# Patient Record
(demographics unavailable — no encounter records)

---

## 2024-10-22 NOTE — REASON FOR VISIT
[Follow-Up Visit] : a follow-up visit for [Morbid Obesity (BMI>40)] : morbid obesity (bmi>40) [S/P Bariatric Surgery] : s/p bariatric surgery [Referring By:  ___] : ~Joanne Ln~ was referred for psychological evaluation by Dr. ENCINAS [Adherence to Medical Regimen] : assess adherence to medical regimen [Emotional Factors Affecting Medical Condition] : assess emotional factors affecting medical condition [Other Location: e.g. School (Enter Location, City,State)___] : at [unfilled], at the time of the visit. [Other Location: e.g. Home (Enter Location, City,State)___] : at [unfilled] [Verbal consent obtained from patient] : the patient, [unfilled] [de-identified] : for tx of psyc sxs related to obesity [de-identified] : Confidentiality and its limitations were explained.

## 2024-10-22 NOTE — DISCUSSION/SUMMARY
[Conventional grooming and hygiene] : Conventional grooming and hygiene [Calm, cooperative] : Calm, cooperative [No unusual behaviors, movements, etc.] : No unusual behaviors, movements, etc. [Normal Rate/Tone/Volume] : Normal Rate/Tone/Volume [Logical, Goal Directed] : Logical, Goal Directed [Behavior plan developed] : Behavior plan developed [Strategies to improve adherence identified] : Strategies to improve adherence identified [Addtnl Health & Behavior Intervention: Individual] : Additional Health and Behavior Intervention: Individual [Coping skills training to overcome social/emotional barriers to adherence] : Coping skills training to overcome social/emotional barriers to adherence [Normal Range] : Normal Range [Euthymic] : Euthymic [de-identified] : good [de-identified] : inconsistent [de-identified] : Concerta causes decreased appetite during day which leads to overeating at night [de-identified] : supportive partner - Pt  scheduled her wedding for 11/2025 and feeling renewed motivation to lose weight.  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   Occasional consumption of carbonated drinks.  Not hungry during the day due to Concerta and has been skipping meals. Also prescribed Lexapro for depression. [de-identified] : Psychological factors (overeating, poor dietary choices) affecting other medical conditions (obesity) Obesity ADHD, Combined Type, by hx Major Depressive Disorder, Recurrent, In Remission Generalized Anxiety Disorder, In Remission [FreeTextEntry3] : follow up with writer x 1 mth [FreeTextEntry5] : compliance with dietary and behavioral recommendations

## 2024-11-27 NOTE — REASON FOR VISIT
[Follow-Up Visit] : a follow-up visit for [Morbid Obesity (BMI>40)] : morbid obesity (bmi>40) [S/P Bariatric Surgery] : s/p bariatric surgery [Referring By:  ___] : ~Joanne Ln~ was referred for psychological evaluation by Dr. ENCINAS [Adherence to Medical Regimen] : assess adherence to medical regimen [Emotional Factors Affecting Medical Condition] : assess emotional factors affecting medical condition [Home] : at home, [unfilled] , at the time of the visit. [Other Location: e.g. Home (Enter Location, City,State)___] : at [unfilled] [Verbal consent obtained from patient] : the patient, [unfilled] [de-identified] : for tx of psyc sxs related to obesity [de-identified] : Confidentiality and its limitations were explained.

## 2024-11-27 NOTE — DISCUSSION/SUMMARY
[Conventional grooming and hygiene] : Conventional grooming and hygiene [Calm, cooperative] : Calm, cooperative [No unusual behaviors, movements, etc.] : No unusual behaviors, movements, etc. [Normal Rate/Tone/Volume] : Normal Rate/Tone/Volume [Normal Range] : Normal Range [Euthymic] : Euthymic [Logical, Goal Directed] : Logical, Goal Directed [Behavior plan developed] : Behavior plan developed [Strategies to improve adherence identified] : Strategies to improve adherence identified [Addtnl Health & Behavior Intervention: Individual] : Additional Health and Behavior Intervention: Individual [Coping skills training to overcome social/emotional barriers to adherence] : Coping skills training to overcome social/emotional barriers to adherence [de-identified] : good [de-identified] : improving [de-identified] : Concerta causes decreased appetite during day which leads to overeating at night [de-identified] : supportive partner - Pt  scheduled her wedding for 11/2025 and feeling renewed motivation to lose weight.  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   Occasional consumption of carbonated drinks.  Not hungry during the day due to Concerta and has been skipping meals. Also prescribed Lexapro for depression. [de-identified] : Psychological factors (overeating, poor dietary choices) affecting other medical conditions (obesity) Obesity ADHD, Combined Type, by hx Major Depressive Disorder, Recurrent, In Remission Generalized Anxiety Disorder, In Remission [FreeTextEntry3] : follow up with writer x 1 mth [FreeTextEntry5] : compliance with dietary and behavioral recommendations

## 2024-12-23 NOTE — DISCUSSION/SUMMARY
[Conventional grooming and hygiene] : Conventional grooming and hygiene [Calm, cooperative] : Calm, cooperative [No unusual behaviors, movements, etc.] : No unusual behaviors, movements, etc. [Normal Rate/Tone/Volume] : Normal Rate/Tone/Volume [Normal Range] : Normal Range [Euthymic] : Euthymic [Logical, Goal Directed] : Logical, Goal Directed [Behavior plan developed] : Behavior plan developed [Strategies to improve adherence identified] : Strategies to improve adherence identified [Addtnl Health & Behavior Intervention: Individual] : Additional Health and Behavior Intervention: Individual [Coping skills training to overcome social/emotional barriers to adherence] : Coping skills training to overcome social/emotional barriers to adherence [de-identified] : good [de-identified] : improving [de-identified] : Concerta causes decreased appetite during day which leads to overeating at night [de-identified] : supportive partner - Pt  scheduled her wedding for 11/2025 and feeling renewed motivation to lose weight.  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   Occasional consumption of carbonated drinks.  Not hungry during the day due to Concerta and has been skipping meals. Also prescribed Lexapro for depression. [de-identified] : Psychological factors (overeating, poor dietary choices) affecting other medical conditions (obesity) Obesity ADHD, Combined Type, by hx Major Depressive Disorder, Recurrent, In Remission Generalized Anxiety Disorder, In Remission [FreeTextEntry3] : follow up with writer x 1 mth [FreeTextEntry5] : compliance with dietary and behavioral recommendations

## 2024-12-23 NOTE — REASON FOR VISIT
[Follow-Up Visit] : a follow-up visit for [Morbid Obesity (BMI>40)] : morbid obesity (bmi>40) [S/P Bariatric Surgery] : s/p bariatric surgery [Referring By:  ___] : ~Joanne Ln~ was referred for psychological evaluation by Dr. ENCINAS [Adherence to Medical Regimen] : assess adherence to medical regimen [Emotional Factors Affecting Medical Condition] : assess emotional factors affecting medical condition [Home] : at home, [unfilled] , at the time of the visit. [Other Location: e.g. Home (Enter Location, City,State)___] : at [unfilled] [Verbal consent obtained from patient] : the patient, [unfilled] [de-identified] : for tx of psyc sxs related to obesity [de-identified] : Confidentiality and its limitations were explained.

## 2025-01-29 NOTE — DISCUSSION/SUMMARY
[Conventional grooming and hygiene] : Conventional grooming and hygiene [Calm, cooperative] : Calm, cooperative [No unusual behaviors, movements, etc.] : No unusual behaviors, movements, etc. [Normal Rate/Tone/Volume] : Normal Rate/Tone/Volume [Normal Range] : Normal Range [Euthymic] : Euthymic [Logical, Goal Directed] : Logical, Goal Directed [de-identified] : good [de-identified] : improving [de-identified] : Concerta causes decreased appetite during day which leads to overeating at night [de-identified] : supportive partner - Pt  scheduled her wedding for 11/2025 and feeling renewed motivation to lose weight.  [Behavior plan developed] : Behavior plan developed [Strategies to improve adherence identified] : Strategies to improve adherence identified [Addtnl Health & Behavior Intervention: Individual] : Additional Health and Behavior Intervention: Individual [Coping skills training to overcome social/emotional barriers to adherence] : Coping skills training to overcome social/emotional barriers to adherence [de-identified] : Psychological factors (overeating, poor dietary choices) affecting other medical conditions (obesity) Obesity ADHD, Combined Type, by hx Major Depressive Disorder, Recurrent, In Remission Generalized Anxiety Disorder, In Remission [FreeTextEntry1] : Katelyn had a sleeve gastrectomy 5/26/2021 and was referred to writer due to weight regain.   Occasional consumption of carbonated drinks.  Not hungry during the day due to Concerta and has been skipping meals. Also prescribed Lexapro for depression. [FreeTextEntry3] : follow up with writer x 1 mth keep a food log [FreeTextEntry5] : compliance with dietary and behavioral recommendations

## 2025-01-29 NOTE — REASON FOR VISIT
[Follow-Up Visit] : a follow-up visit for [Morbid Obesity (BMI>40)] : morbid obesity (bmi>40) [S/P Bariatric Surgery] : s/p bariatric surgery [Referring By:  ___] : ~Joanne Ln~ was referred for psychological evaluation by Dr. ENCINAS [Adherence to Medical Regimen] : assess adherence to medical regimen [Emotional Factors Affecting Medical Condition] : assess emotional factors affecting medical condition [de-identified] : for tx of psyc sxs related to obesity [Home] : at home, [unfilled] , at the time of the visit. [Other Location: e.g. Home (Enter Location, City,State)___] : at [unfilled] [Verbal consent obtained from patient] : the patient, [unfilled] [de-identified] : Confidentiality and its limitations were explained.

## 2025-03-03 NOTE — REASON FOR VISIT
[Follow-Up Visit] : a follow-up visit for [Morbid Obesity (BMI>40)] : morbid obesity (bmi>40) [S/P Bariatric Surgery] : s/p bariatric surgery [Referring By:  ___] : ~Joanne Ln~ was referred for psychological evaluation by Dr. ENCINAS [Adherence to Medical Regimen] : assess adherence to medical regimen [Emotional Factors Affecting Medical Condition] : assess emotional factors affecting medical condition [Verbal consent obtained from patient] : the patient, [unfilled] [de-identified] : for tx of psyc sxs related to obesity [Other Location: e.g. School (Enter Location, City,State)___] : at [unfilled], at the time of the visit. [Medical Office: ([facility])___] : at the medical office located in  [de-identified] : Confidentiality and its limitations were explained.

## 2025-03-03 NOTE — DISCUSSION/SUMMARY
[Conventional grooming and hygiene] : Conventional grooming and hygiene [Calm, cooperative] : Calm, cooperative [No unusual behaviors, movements, etc.] : No unusual behaviors, movements, etc. [Normal Rate/Tone/Volume] : Normal Rate/Tone/Volume [Normal Range] : Normal Range [Euthymic] : Euthymic [Logical, Goal Directed] : Logical, Goal Directed [Behavior plan developed] : Behavior plan developed [Strategies to improve adherence identified] : Strategies to improve adherence identified [Addtnl Health & Behavior Intervention: Individual] : Additional Health and Behavior Intervention: Individual [Coping skills training to overcome social/emotional barriers to adherence] : Coping skills training to overcome social/emotional barriers to adherence [de-identified] : good [de-identified] : improving [de-identified] : Concerta causes decreased appetite during day which leads to overeating at night [de-identified] : supportive partner - Pt  scheduled her wedding for 11/2025 and feeling renewed motivation to lose weight.  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   Occasional consumption of carbonated drinks.  Not hungry during the day due to Concerta and has been skipping meals. Also prescribed Lexapro for depression. [de-identified] : Psychological factors (overeating, poor dietary choices) affecting other medical conditions (obesity) Obesity ADHD, Combined Type, by hx Major Depressive Disorder, Recurrent, In Remission Generalized Anxiety Disorder, In Remission [FreeTextEntry3] : follow up with writer x 1 mth keep a food log [FreeTextEntry5] : compliance with dietary and behavioral recommendations

## 2025-04-29 NOTE — DISCUSSION/SUMMARY
[Conventional grooming and hygiene] : Conventional grooming and hygiene [Calm, cooperative] : Calm, cooperative [No unusual behaviors, movements, etc.] : No unusual behaviors, movements, etc. [Normal Rate/Tone/Volume] : Normal Rate/Tone/Volume [Normal Range] : Normal Range [Euthymic] : Euthymic [Logical, Goal Directed] : Logical, Goal Directed [Behavior plan developed] : Behavior plan developed [Strategies to improve adherence identified] : Strategies to improve adherence identified [Addtnl Health & Behavior Intervention: Individual] : Additional Health and Behavior Intervention: Individual [Coping skills training to overcome social/emotional barriers to adherence] : Coping skills training to overcome social/emotional barriers to adherence [None] : None [No delusions or hallucinations] : No delusions or hallucinations [Oriented to person, place, time] : Oriented to person, place, time [Intact/WNL] : Intact/WNL [Adequate/WNL] : Adequate/WNL [de-identified] : good [de-identified] : improving [de-identified] : Concerta causes decreased appetite during day which leads to overeating at night [de-identified] : supportive partner - Pt  scheduled her wedding for 11/2025 and feeling renewed motivation to lose weight.  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   Occasional consumption of carbonated drinks.  Not hungry during the day due to Concerta and has been skipping meals. Also prescribed Lexapro and Wellbutrin for depression. [de-identified] : Psychological factors (overeating, poor dietary choices) affecting other medical conditions (obesity) Obesity Major Depressive Disorder, Recurrent ADHD, Combined Type, by hx Generalized Anxiety Disorder, In Remission [FreeTextEntry3] : follow up with writer x 1 mth keep a food log [FreeTextEntry5] : compliance with dietary and behavioral recommendations

## 2025-04-29 NOTE — REASON FOR VISIT
[Follow-Up Visit] : a follow-up visit for [Morbid Obesity (BMI>40)] : morbid obesity (bmi>40) [S/P Bariatric Surgery] : s/p bariatric surgery [Referring By:  ___] : ~Joanne Ln~ was referred for psychological evaluation by Dr. ENCINAS [Adherence to Medical Regimen] : assess adherence to medical regimen [Emotional Factors Affecting Medical Condition] : assess emotional factors affecting medical condition [Other Location: e.g. School (Enter Location, City,State)___] : at [unfilled], at the time of the visit. [Medical Office: ([facility])___] : at the medical office located in  [Verbal consent obtained from patient] : the patient, [unfilled] [de-identified] : for tx of psyc sxs related to obesity [de-identified] : Confidentiality and its limitations were explained.

## 2025-06-03 NOTE — REASON FOR VISIT
[Follow-Up Visit] : a follow-up visit for [Morbid Obesity (BMI>40)] : morbid obesity (bmi>40) [S/P Bariatric Surgery] : s/p bariatric surgery [Referring By:  ___] : ~Joanne Ln~ was referred for psychological evaluation by Dr. ENCINAS [Adherence to Medical Regimen] : assess adherence to medical regimen [Emotional Factors Affecting Medical Condition] : assess emotional factors affecting medical condition [Home] : at home, [unfilled] , at the time of the visit. [Other Location: e.g. Home (Enter Location, City,State)___] : at [unfilled] [Verbal consent obtained from patient] : the patient, [unfilled] [de-identified] : for tx of psyc sxs related to obesity [de-identified] : Confidentiality and its limitations were explained.

## 2025-06-03 NOTE — DISCUSSION/SUMMARY
[Conventional grooming and hygiene] : Conventional grooming and hygiene [Calm, cooperative] : Calm, cooperative [No unusual behaviors, movements, etc.] : No unusual behaviors, movements, etc. [Normal Rate/Tone/Volume] : Normal Rate/Tone/Volume [Normal Range] : Normal Range [Euthymic] : Euthymic [Logical, Goal Directed] : Logical, Goal Directed [None] : None [No delusions or hallucinations] : No delusions or hallucinations [Oriented to person, place, time] : Oriented to person, place, time [Intact/WNL] : Intact/WNL [Adequate/WNL] : Adequate/WNL [Behavior plan developed] : Behavior plan developed [Strategies to improve adherence identified] : Strategies to improve adherence identified [Addtnl Health & Behavior Intervention: Individual] : Additional Health and Behavior Intervention: Individual [Coping skills training to overcome social/emotional barriers to adherence] : Coping skills training to overcome social/emotional barriers to adherence [de-identified] : good [de-identified] : excellent [de-identified] : Concerta causes decreased appetite during day which leads to overeating at night [de-identified] : supportive partner - Pt  scheduled her wedding for 11/2025 and feeling renewed motivation to lose weight.  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   Occasional consumption of carbonated drinks.  Not hungry during the day due to Concerta and has been skipping meals. Also prescribed Lexapro and Wellbutrin for depression. [de-identified] : Psychological factors (overeating, poor dietary choices) affecting other medical conditions (obesity) Obesity Major Depressive Disorder, Recurrent ADHD, Combined Type, by hx Generalized Anxiety Disorder, In Remission [FreeTextEntry3] : follow up with writer x 1 mth [FreeTextEntry5] : compliance with dietary and behavioral recommendations